# Patient Record
Sex: FEMALE | URBAN - METROPOLITAN AREA
[De-identification: names, ages, dates, MRNs, and addresses within clinical notes are randomized per-mention and may not be internally consistent; named-entity substitution may affect disease eponyms.]

---

## 2018-01-05 ENCOUNTER — APPOINTMENT (OUTPATIENT)
Age: 23
Setting detail: DERMATOLOGY
End: 2018-01-05

## 2018-01-05 DIAGNOSIS — L81.4 OTHER MELANIN HYPERPIGMENTATION: ICD-10-CM

## 2018-01-05 DIAGNOSIS — L259 CONTACT DERMATITIS AND OTHER ECZEMA, UNSPECIFIED CAUSE: ICD-10-CM

## 2018-01-05 PROBLEM — L23.9 ALLERGIC CONTACT DERMATITIS, UNSPECIFIED CAUSE: Status: ACTIVE | Noted: 2018-01-05

## 2018-01-05 PROBLEM — F41.9 ANXIETY DISORDER, UNSPECIFIED: Status: ACTIVE | Noted: 2018-01-05

## 2018-01-05 PROCEDURE — ? OTHER

## 2018-01-05 PROCEDURE — ? PRESCRIPTION

## 2018-01-05 PROCEDURE — ? COUNSELING

## 2018-01-05 PROCEDURE — 99203 OFFICE O/P NEW LOW 30 MIN: CPT

## 2018-01-05 RX ORDER — EPINEPHRINE 0.3MG/0.3
AUTO-INJECTOR (EA) INJECTION
Qty: 1 | Refills: 1 | Status: ERX | COMMUNITY
Start: 2018-01-05

## 2018-01-05 RX ADMIN — Medication: at 14:08

## 2018-01-05 ASSESSMENT — LOCATION DETAILED DESCRIPTION DERM
LOCATION DETAILED: LEFT MEDIAL UPPER BACK
LOCATION DETAILED: LEFT MEDIAL SUPERIOR CHEST

## 2018-01-05 ASSESSMENT — LOCATION SIMPLE DESCRIPTION DERM
LOCATION SIMPLE: CHEST
LOCATION SIMPLE: LEFT UPPER BACK

## 2018-01-05 ASSESSMENT — LOCATION ZONE DERM: LOCATION ZONE: TRUNK

## 2018-01-05 NOTE — PROCEDURE: MIPS QUALITY
Detail Level: Detailed
Quality 111:Pneumonia Vaccination Status For Older Adults: Pneumococcal Vaccination not Administered or Previously Received, Reason not Otherwise Specified
Quality 110: Preventive Care And Screening: Influenza Immunization: Influenza Immunization Ordered or Recommended, but not Administered due to system reason
Quality 226: Preventive Care And Screening: Tobacco Use: Screening And Cessation Intervention: Patient screened for tobacco and never smoked

## 2018-01-05 NOTE — PROCEDURE: COUNSELING
Patient Specific Counseling (Will Not Stick From Patient To Patient): This patient denies any metal allergies such as gold and silver jewelry.  She has a history of hey fever.  And has a family history of hey fever and asthma.  Her surgical history is only removal of her wisdom teeth. She denies fillings in her teeth. Plan to get a list from surgeon of metals and any additional items needing to be included for patch testing , for her this surgical clearance.
Detail Level: Detailed

## 2018-01-09 ENCOUNTER — APPOINTMENT (OUTPATIENT)
Age: 23
Setting detail: DERMATOLOGY
End: 2018-01-09

## 2018-01-09 DIAGNOSIS — L20.89 OTHER ATOPIC DERMATITIS: ICD-10-CM | Status: WELL CONTROLLED

## 2018-01-09 PROCEDURE — 95044 PATCH/APPLICATION TESTS: CPT

## 2018-01-09 PROCEDURE — ? PATCH TESTING

## 2018-01-09 NOTE — HPI: TESTING (PATCH TESTING)
Has Your Rash Been Biopsied Before?: has not been biopsied previously
Have You Had Previous Patch Testing In The Past?: none
How Severe Is Your Rash At Its Worst?: mild
Who Is Your Referring Doctor?: Dr.Robert Romano
Additional History: Patch testing pre-op to R/O ACD to metals to be used in surgery.

## 2018-01-09 NOTE — PROCEDURE: PATCH TESTING
Number Of Patches (Maximum Allowable Per Dos By Cms Is 90): 54
Post-Care Instructions: I reviewed with the patient in detail post-care instructions. Patient should not sweat, pick at, or get the patches wet for 48 hours.
Number Of Patches (Maximum Allowable Per Dos By Cms Is 90): 84
Detail Level: Zone
Consent: Written consent obtained, risks reviewed including but not limited to rash, itching, allergic reaction, systemic rash, remote possiblity of anaphylaxis to allergen.

## 2018-01-11 ENCOUNTER — APPOINTMENT (OUTPATIENT)
Age: 23
Setting detail: DERMATOLOGY
End: 2018-01-11

## 2018-01-11 DIAGNOSIS — L20.89 OTHER ATOPIC DERMATITIS: ICD-10-CM

## 2018-01-11 PROCEDURE — ? METALS PATCH TEST READING

## 2018-01-11 PROCEDURE — 99213 OFFICE O/P EST LOW 20 MIN: CPT

## 2018-01-11 PROCEDURE — ? NORTH AMERICAN 80 PATCH TEST READING

## 2018-01-11 NOTE — HPI: TESTING (PATCH TESTING READING, DISCUSSION OF RESULTS)
How Severe Is Your Rash?: mild
What Patch Testing Have You Undergone?: other
What Reading Is This?: 48 hour patch test reading

## 2018-01-11 NOTE — PROCEDURE: NORTH AMERICAN 80 PATCH TEST READING
Allergen 24 Reaction: no reaction
Name Of Allergen 82: EthylHexylGlycerin
Name Of Allergen 71: Isoamyl-p-methoxycinnamate
Detail Level: Zone
What Reading Time Point?: 48 hour
Name Of Allergen 34: Benzophenone-3 / (2-Hydroxy-4-methoxybenzophenone)
Name Of Allergen 76: Cocamidopropylbetaine
Name Of Allergen 38: Goldsodiumthiosulfate
Allergen 19 Reaction: +/-
Name Of Allergen 58: Benzyl alcohol
Name Of Allergen 49: Tea Tree Oil
Name Of Allergen 28: Fragrance mix
Name Of Allergen 51: Disperse Yellow 3
Name Of Allergen 39: Ethyl acrylate
Name Of Allergen 75: Amidoamine
Name Of Allergen 84: Lanolin Alcohol
Name Of Allergen 26: Paraben Mix
Name Of Allergen 18: Potassium dichromate
Name Of Allergen 54: Methylisothiazolinone
Name Of Allergen 2: 2-Mercaptobenzothiazole (MBT)
Name Of Allergen 62: Polysorbate 80 / Polyoxyethylenesorbitanmonooleate
Name Of Allergen 17: N-Isopropyl-N-phenyl--4-phenylenediamine
Name Of Allergen 24: Mixed dialkyl thiourea
Name Of Allergen 22: Tocopherol/ (DL alpha tocopherol)
Name Of Allergen 64: 2-n-Octyl-4-isothiazolin-3-one
Name Of Allergen 60: Triclosan (Irgasan )
Show Negative Results In The Note?: Yes
Name Of Allergen 68: Fusidic acid sodium salt
Name Of Allergen 59: Isopropyl myristate
Name Of Allergen 14: Quaternium-15
Name Of Allergen 8: Carba mix
Name Of Allergen 15: 9-zzur-Egztmxnecwvvnzixvwbampk resin
Name Of Allergen 77: Formaldehyde
Allergen 43 Reaction: 1+
Name Of Allergen 73: Ethylhexyl (Octyl Salicylate)
Name Of Allergen 53: Decyl Glucoside
Name Of Allergen 4: p-Phenylenediamine (PPD)
Name Of Allergen 29: Glutaral / (Glutaraldehyde)
Name Of Allergen 52: Benzyl salicylate
Name Of Allergen 30: 2-Bromo-2-nitropropane-l,3-diol (Bronopol)
Name Of Allergen 69: Dibucaine hydrochloride
Name Of Allergen 63: Iodopropynyl butyl carbamate
Name Of Allergen 83: Sodium Lauryl Sulfate
Name Of Allergen 74: Benzalkonium chloride
Number Of Patches Read: 84
Name Of Allergen 23: Bacitracin
Name Of Allergen 66: Compositae Mix II
Name Of Allergen 42: Methyl methacrylate
Name Of Allergen 33: Propolis
Name Of Allergen 78: Methylisothiazolinone + Methylchloroisothiazolinone
Name Of Allergen 50: Fragrance Mix II
Name Of Allergen 45: Budesonide
Name Of Allergen 35: Chloroxylenol (PCMX) / (4-Chloro-3,5-xylenol (PCMX))
Name Of Allergen 47: Triethanolamine
Name Of Allergen 55: HEMA (2-Hydroxyethyl methacrylate)
Name Of Allergen 32: Thimerosal (Merthiolate)
Name Of Allergen 20: Nickelsulfate hexahydrate
Name Of Allergen 67: Lidocaine
Name Of Allergen 81: EDTA (Disodium)
Name Of Allergen 44: Tixocortol-21-pivalate
Name Of Allergen 48: Hydrocortisone-17-butyrate
Name Of Allergen 80: Dimethylol dihydroxyethyleneurea( Fix.CPN)
Name Of Allergen 5: Imidazolidinyl urea Germall 115
Name Of Allergen 40: Glyceryl monothioglycolate (GMTG)
Name Of Allergen 21: Diazolidinylurea (Germall II)
Name Of Allergen 70: Benzoylperoxide
Name Of Allergen 12: Ethylenediamine dihydrochloride
Name Of Allergen 19: Myroxylon pereirae resin / (Balsam [place])
Name Of Allergen 11: Clobetasol-17-propionate
Name Of Allergen 16: Mercapto mix
Name Of Allergen 65: Disperse Blue 106/124 Mix
Name Of Allergen 43: Cobalt(II) chloride hexahydrate
Name Of Allergen 3: Colophonium / (Colophony)
Name Of Allergen 46: Cocamide MAR / (Coconut diethanolamide)
Name Of Allergen 10: Thiuram mix
Name Of Allergen 79: Propylene glycol
Name Of Allergen 1: Benzocaine
Name Of Allergen 56: DMDM Hydantoin
Name Of Allergen 25: Disperse Orange 3
Name Of Allergen 36: Ethyleneurea, melamine formaldehyde mix
Name Of Allergen 72: Hydroxyisohexyl 3-Cyclohexene Carboxaldehyde (Lyral)
Name Of Allergen 27: Methyldibromo glutaronitrile (MDBGN)
Name Of Allergen 37: 2-tert-Butyl-4-methoxyphenol (BHA)
Name Of Allergen 41: Toluenesulfonamide formaldehyde resin
Name Of Allergen 57: [place] odorata oil / (Ylang-Ylang oil)
Name Of Allergen 9: Neomycin sulfate
Name Of Allergen 61: Desoximetasone
Name Of Allergen 7: Amerchol L 101
Name Of Allergen 13: Epoxy resin Bisphenol A
Name Of Allergen 31: Sesquiterpene lactone mix
Show Allergen Counseling In The Note?: No
Name Of Allergen 6: Cinnamal / (Cinnamic aldehyde)

## 2018-01-13 ENCOUNTER — APPOINTMENT (OUTPATIENT)
Age: 23
Setting detail: DERMATOLOGY
End: 2018-01-13

## 2018-01-13 DIAGNOSIS — L259 CONTACT DERMATITIS AND OTHER ECZEMA, UNSPECIFIED CAUSE: ICD-10-CM

## 2018-01-13 PROBLEM — L23.0 ALLERGIC CONTACT DERMATITIS DUE TO METALS: Status: ACTIVE | Noted: 2018-01-13

## 2018-01-13 PROCEDURE — ? DIAGNOSIS COMMENT

## 2018-01-13 PROCEDURE — 99213 OFFICE O/P EST LOW 20 MIN: CPT

## 2018-01-13 PROCEDURE — ? METALS PATCH TEST READING

## 2018-01-13 PROCEDURE — ? ADDITIONAL NOTES

## 2018-01-13 PROCEDURE — ? NORTH AMERICAN 80 PATCH TEST READING

## 2018-01-13 NOTE — PROCEDURE: NORTH AMERICAN 80 PATCH TEST READING
Name Of Allergen 24: Mixed dialkyl thiourea
Name Of Allergen 22: Tocopherol/ (DL alpha tocopherol)
Allergen 91 Reaction: no reaction
Detail Level: Zone
Name Of Allergen 8: Carba mix
Show Allergen Counseling In The Note?: No
Name Of Allergen 61: Desoximetasone
Name Of Allergen 17: N-Isopropyl-N-phenyl--4-phenylenediamine
Name Of Allergen 82: EthylHexylGlycerin
Name Of Allergen 42: Methyl methacrylate
Name Of Allergen 68: Fusidic acid sodium salt
Name Of Allergen 81: EDTA (Disodium)
Name Of Allergen 66: Compositae Mix II
Name Of Allergen 13: Epoxy resin Bisphenol A
Name Of Allergen 56: DMDM Hydantoin
Name Of Allergen 73: Ethylhexyl (Octyl Salicylate)
Name Of Allergen 72: Hydroxyisohexyl 3-Cyclohexene Carboxaldehyde (Lyral)
Show Negative Results In The Note?: Yes
Name Of Allergen 21: Diazolidinylurea (Germall II)
Name Of Allergen 64: 2-n-Octyl-4-isothiazolin-3-one
Number Of Patches Read: 84
Name Of Allergen 2: 2-Mercaptobenzothiazole (MBT)
Name Of Allergen 67: Lidocaine
Name Of Allergen 41: Toluenesulfonamide formaldehyde resin
Name Of Allergen 70: Benzoylperoxide
Name Of Allergen 28: Fragrance mix
Name Of Allergen 47: Triethanolamine
Name Of Allergen 78: Methylisothiazolinone + Methylchloroisothiazolinone
Name Of Allergen 10: Thiuram mix
Name Of Allergen 15: 7-aoho-Qdxbghasnmlxrkjivzdhsqs resin
Name Of Allergen 45: Budesonide
Name Of Allergen 46: Cocamide MAR / (Coconut diethanolamide)
Name Of Allergen 84: Lanolin Alcohol
Name Of Allergen 14: Quaternium-15
Name Of Allergen 4: p-Phenylenediamine (PPD)
Name Of Allergen 49: Tea Tree Oil
Name Of Allergen 39: Ethyl acrylate
Name Of Allergen 20: Nickelsulfate hexahydrate
Name Of Allergen 80: Dimethylol dihydroxyethyleneurea( Fix.CPN)
Name Of Allergen 76: Cocamidopropylbetaine
Name Of Allergen 54: Methylisothiazolinone
Name Of Allergen 83: Sodium Lauryl Sulfate
Name Of Allergen 50: Fragrance Mix II
Name Of Allergen 5: Imidazolidinyl urea Germall 115
Name Of Allergen 7: Amerchol L 101
Name Of Allergen 25: Disperse Orange 3
Name Of Allergen 43: Cobalt(II) chloride hexahydrate
Name Of Allergen 37: 2-tert-Butyl-4-methoxyphenol (BHA)
Name Of Allergen 27: Methyldibromo glutaronitrile (MDBGN)
Name Of Allergen 57: [place] odorata oil / (Ylang-Ylang oil)
Name Of Allergen 71: Isoamyl-p-methoxycinnamate
Name Of Allergen 65: Disperse Blue 106/124 Mix
Name Of Allergen 52: Benzyl salicylate
Name Of Allergen 16: Mercapto mix
Name Of Allergen 77: Formaldehyde
Name Of Allergen 23: Bacitracin
Allergen 19 Reaction: +/-
Name Of Allergen 6: Cinnamal / (Cinnamic aldehyde)
Name Of Allergen 59: Isopropyl myristate
Name Of Allergen 26: Paraben Mix
Name Of Allergen 32: Thimerosal (Merthiolate)
Name Of Allergen 36: Ethyleneurea, melamine formaldehyde mix
Name Of Allergen 3: Colophonium / (Colophony)
Name Of Allergen 60: Triclosan (Irgasan )
Name Of Allergen 40: Glyceryl monothioglycolate (GMTG)
Allergen 43 Reaction: 2+
Name Of Allergen 53: Decyl Glucoside
Name Of Allergen 63: Iodopropynyl butyl carbamate
Name Of Allergen 1: Benzocaine
Name Of Allergen 12: Ethylenediamine dihydrochloride
Name Of Allergen 48: Hydrocortisone-17-butyrate
Name Of Allergen 79: Propylene glycol
Name Of Allergen 75: Amidoamine
Name Of Allergen 35: Chloroxylenol (PCMX) / (4-Chloro-3,5-xylenol (PCMX))
Name Of Allergen 33: Propolis
Name Of Allergen 19: Myroxylon pereirae resin / (Balsam [place])
Name Of Allergen 44: Tixocortol-21-pivalate
Name Of Allergen 51: Disperse Yellow 3
Name Of Allergen 62: Polysorbate 80 / Polyoxyethylenesorbitanmonooleate
Allergen 70 Reaction: 1+
What Reading Time Point?: 96 hour
Name Of Allergen 31: Sesquiterpene lactone mix
Name Of Allergen 18: Potassium dichromate
Name Of Allergen 74: Benzalkonium chloride
Name Of Allergen 29: Glutaral / (Glutaraldehyde)
Name Of Allergen 9: Neomycin sulfate
Name Of Allergen 55: HEMA (2-Hydroxyethyl methacrylate)
Name Of Allergen 34: Benzophenone-3 / (2-Hydroxy-4-methoxybenzophenone)
Name Of Allergen 58: Benzyl alcohol
Name Of Allergen 38: Goldsodiumthiosulfate
Name Of Allergen 11: Clobetasol-17-propionate
Name Of Allergen 30: 2-Bromo-2-nitropropane-l,3-diol (Bronopol)
Name Of Allergen 69: Dibucaine hydrochloride

## 2018-01-13 NOTE — PROCEDURE: ADDITIONAL NOTES
Additional Notes: After patch testing to 138 haptens in the [place] Extended Series and Metal Series, the patient is positive to the following: Nickel, Cobalt, Palladium, Sodium tetrachloropalladate, Stannous Chloride, Balsam of [place], and Benzoyl Peroxide. Patient currently experiences reactions to jewelry containing Nickel and kenneth snaps that likely contain [place]. Advised patient to d/c costume jewelry and only wear jewelry that does not contain her positive allergens (i.e. 925 silver, 18K gold, etc). Based on patient’s results of patch testing, patient needs to be fitted for a Titanium bar as the current formulation of the pectus bar contains Nickel and she is allergic.  Patient was tested to the following four forms of Titanium in the Metal Series: Titanium (III) Nitride, Titanium Dioxide, Titanium (III) Oxalate decahydrate, and Titanium.

## 2018-01-13 NOTE — PROCEDURE: METALS PATCH TEST READING
Allergen 23 Reaction: no reaction
Name Of Allergen 41: Ammonium hexachloroplatinate
Name Of Allergen 51: Tantalum
Name Of Allergen 43: Sodium tetrachloropalladate(II) hydrate
Name Of Allergen 5: Mercury amidochloride
Name Of Allergen 37: Indium sulphate
Name Of Allergen 3: Mercuric chloride
Name Of Allergen 10: Goldsodiumthiosulfate
Name Of Allergen 54: Beryllium (II) Sulfate tetrahydrate
Name Of Allergen 26: Stannous oxalate (Tin)
Name Of Allergen 25: Manganese (II) chloride
Show Negative Results In The Note?: Yes
Name Of Allergen 16: Titanium nitride
Name Of Allergen 42: Ammonium tetrachloroplatinate (II)
Name Of Allergen 44: Gallium (III) Oxide
Name Of Allergen 31: Potassium dicyanoaurate
What Reading Time Point?: 96 hour
Number Of Patches Read: 54
Name Of Allergen 20: Calcium titanate
Name Of Allergen 28: Tungsten (Cesar)
Name Of Allergen 14: Iridium
Name Of Allergen 46: Sodium [place] dihydrate
Name Of Allergen 39: Stannous chloride
Name Of Allergen 19: Titanium oxalate
Name Of Allergen 9: Copper sulfate
Name Of Allergen 36: Lead acetate trihydrate
Name Of Allergen 35: Indium (III) chloride
Name Of Allergen 49: Molybdenum (V) Chloride
Show Allergen Counseling In The Note?: No
Name Of Allergen 32: Silvernitrate
Name Of Allergen 23: Molybdenum
Name Of Allergen 45: Ruthenium
Name Of Allergen 34: Ammonium hexachloroiridate
Name Of Allergen 24: Vanadium (III) chloride
Name Of Allergen 22: Vanadium
Name Of Allergen 11: Copper (I) oxide
Name Of Allergen 12: Tin
Name Of Allergen 2: Mercury
Name Of Allergen 6: Aluminum
Name Of Allergen 50: Niobium (V) Chloride
Name Of Allergen 29: Ferric chloride / (Iron (III) chloride)
Name Of Allergen 52: Zirconium Dioxide
Name Of Allergen 7: Palladium chloride
Name Of Allergen 33: Cadmium chloride
Allergen 39 Reaction: +/-
Name Of Allergen 4: Aluminum chloride hexahydrate
Name Of Allergen 30: Phenylmercuric acetate
Name Of Allergen 18: Zinc chloride
Name Of Allergen 1: Zinc
Name Of Allergen 48: Aluminum Hydroxide
Name Of Allergen 15: Indium
Allergen 18 Reaction: 1+
Name Of Allergen 21: Titanium
Name Of Allergen 40: Lead chloride
Name Of Allergen 47: Vanadium (V) Oxide
Name Of Allergen 27: Zirconium chloride
Name Of Allergen 13: Iridium (III) chloride trihydrate
Name Of Allergen 38: Ammonium molybdatetetrahydrate
Detail Level: Zone
Name Of Allergen 17: Titanium dioxide
Name Of Allergen 53: Rhodium (III) Chloride Hydrate

## 2018-01-13 NOTE — PROCEDURE: DIAGNOSIS COMMENT
Detail Level: Simple
Comment: Letter sent to Dr.Robert Romano at [facility] per patient's request. Pectus bar must be titanium, NO NICKEL.

## 2018-01-13 NOTE — HPI: TESTING (PATCH TESTING READING, DISCUSSION OF RESULTS)
What Patch Testing Have You Undergone?: other
What Reading Is This?: 96 hour patch test reading
Additional History: R/o ACD to metals in the current formulation of the pectus bar.

## 2019-11-01 ENCOUNTER — APPOINTMENT (RX ONLY)
Dept: URBAN - METROPOLITAN AREA CLINIC 369 | Facility: CLINIC | Age: 24
Setting detail: DERMATOLOGY
End: 2019-11-01

## 2019-11-01 DIAGNOSIS — L73.9 FOLLICULAR DISORDER, UNSPECIFIED: ICD-10-CM

## 2019-11-01 DIAGNOSIS — L738 OTHER SPECIFIED DISEASES OF HAIR AND HAIR FOLLICLES: ICD-10-CM

## 2019-11-01 DIAGNOSIS — L663 OTHER SPECIFIED DISEASES OF HAIR AND HAIR FOLLICLES: ICD-10-CM

## 2019-11-01 PROBLEM — F41.9 ANXIETY DISORDER, UNSPECIFIED: Status: ACTIVE | Noted: 2019-11-01

## 2019-11-01 PROBLEM — L02.222 FURUNCLE OF BACK [ANY PART, EXCEPT BUTTOCK]: Status: ACTIVE | Noted: 2019-11-01

## 2019-11-01 PROBLEM — L02.02 FURUNCLE OF FACE: Status: ACTIVE | Noted: 2019-11-01

## 2019-11-01 PROCEDURE — 99202 OFFICE O/P NEW SF 15 MIN: CPT

## 2019-11-01 PROCEDURE — ? TREATMENT REGIMEN

## 2019-11-01 PROCEDURE — ? MEDICATION COUNSELING

## 2019-11-01 PROCEDURE — ? COUNSELING

## 2019-11-01 ASSESSMENT — LOCATION ZONE DERM: LOCATION ZONE: TRUNK

## 2019-11-01 ASSESSMENT — LOCATION SIMPLE DESCRIPTION DERM
LOCATION SIMPLE: RIGHT UPPER BACK
LOCATION SIMPLE: CHEST

## 2019-11-01 ASSESSMENT — LOCATION DETAILED DESCRIPTION DERM
LOCATION DETAILED: RIGHT SUPERIOR MEDIAL UPPER BACK
LOCATION DETAILED: LEFT MEDIAL SUPERIOR CHEST

## 2019-11-01 NOTE — PROCEDURE: MEDICATION COUNSELING
Xelswapnaz Pregnancy And Lactation Text: This medication is Pregnancy Category D and is not considered safe during pregnancy.  The risk during breast feeding is also uncertain.

## 2019-11-01 NOTE — PROCEDURE: MEDICATION COUNSELING
Declines Griseofulvin Counseling:  I discussed with the patient the risks of griseofulvin including but not limited to photosensitivity, cytopenia, liver damage, nausea/vomiting and severe allergy.  The patient understands that this medication is best absorbed when taken with a fatty meal (e.g., ice cream or french fries).

## 2019-11-01 NOTE — PROCEDURE: TREATMENT REGIMEN
Otc Regimen: Apply Differin gel 0.1% to shoulders, chest and back at night\\nApply glysal 10-2 Spray to shoulders, chest and back after showering\\n\\nConsider oral abx if symptoms do not improve
Detail Level: Zone

## 2022-05-20 NOTE — PROCEDURE: OTHER
Other (Free Text): Plan metal patch series. She is getting surgery for pectus excavatum repair, concerned about allergic reactions to implants, plan patch test to metals chemicals that will be used.
Note Text (......Xxx Chief Complaint.): This diagnosis correlates with the
Other (Free Text): Benign lesions, should not interfere with patch testing
Detail Level: Zone
sono negative.  Will dc

## 2022-11-27 NOTE — PROCEDURE: MEDICATION COUNSELING
100.4 Stelara Counseling:  I discussed with the patient the risks of ustekinumab including but not limited to immunosuppression, malignancy, posterior leukoencephalopathy syndrome, and serious infections.  The patient understands that monitoring is required including a PPD at baseline and must alert us or the primary physician if symptoms of infection or other concerning signs are noted.

## 2024-09-16 NOTE — PROCEDURE: MEDICATION COUNSELING
Oxybutynin Counseling:  I discussed with the patient the risks of oxybutynin including but not limited to skin rash, drowsiness, dry mouth, difficulty urinating, and blurred vision. see mdm